# Patient Record
Sex: FEMALE | Race: WHITE
[De-identification: names, ages, dates, MRNs, and addresses within clinical notes are randomized per-mention and may not be internally consistent; named-entity substitution may affect disease eponyms.]

---

## 2022-04-14 ENCOUNTER — HOSPITAL ENCOUNTER (EMERGENCY)
Dept: HOSPITAL 95 - ER | Age: 62
Discharge: HOME | End: 2022-04-14
Payer: OTHER GOVERNMENT

## 2022-04-14 VITALS — BODY MASS INDEX: 24.24 KG/M2 | WEIGHT: 142 LBS | HEIGHT: 64 IN

## 2022-04-14 DIAGNOSIS — M54.42: Primary | ICD-10-CM

## 2022-04-14 PROCEDURE — A9270 NON-COVERED ITEM OR SERVICE: HCPCS

## 2022-06-05 ENCOUNTER — HOSPITAL ENCOUNTER (EMERGENCY)
Dept: HOSPITAL 95 - ER | Age: 62
Discharge: HOME | End: 2022-06-05
Payer: OTHER GOVERNMENT

## 2022-06-05 VITALS — WEIGHT: 134.99 LBS | HEIGHT: 62 IN | BODY MASS INDEX: 24.84 KG/M2

## 2022-06-05 DIAGNOSIS — Z79.899: ICD-10-CM

## 2022-06-05 DIAGNOSIS — Z88.5: ICD-10-CM

## 2022-06-05 DIAGNOSIS — M48.061: ICD-10-CM

## 2022-06-05 DIAGNOSIS — M48.56XA: Primary | ICD-10-CM

## 2022-10-26 ENCOUNTER — HOSPITAL ENCOUNTER (OUTPATIENT)
Dept: HOSPITAL 95 - ORSCSDS | Age: 62
Discharge: HOME | End: 2022-10-26
Attending: OTOLARYNGOLOGY
Payer: OTHER GOVERNMENT

## 2022-10-26 VITALS — BODY MASS INDEX: 24.24 KG/M2 | HEIGHT: 60 IN | WEIGHT: 123.46 LBS

## 2022-10-26 DIAGNOSIS — I10: ICD-10-CM

## 2022-10-26 DIAGNOSIS — Z79.899: ICD-10-CM

## 2022-10-26 DIAGNOSIS — Z79.84: ICD-10-CM

## 2022-10-26 DIAGNOSIS — E11.9: ICD-10-CM

## 2022-10-26 DIAGNOSIS — C73: Primary | ICD-10-CM

## 2022-10-26 PROCEDURE — 0GTK0ZZ RESECTION OF THYROID GLAND, OPEN APPROACH: ICD-10-PCS | Performed by: OTOLARYNGOLOGY

## 2022-10-26 PROCEDURE — 0GBJ0ZZ EXCISION OF THYROID GLAND ISTHMUS, OPEN APPROACH: ICD-10-PCS | Performed by: OTOLARYNGOLOGY

## 2022-10-26 NOTE — NUR
10/26/22 1639 Vincent Resendiz
PT VOMITED ONCE 20ML YELLOW EMESIS. SHE WAS MEDICATED WITH 4MG IV
ZOFRAN,
PER DR. WILCOX ORDERS. SHORTLY AFTER RECIEVING MEDICATION PT STATED
THAT HER NAUSEA HAD RESOLVED. PT STATED THAT HER NAUSEA WAS RETURNING
IMMEDIATELY PRIOR TO DISCHARGE, BUT SHE EXPRESSED READINESS TO GO
HOME. SHE WAS PROVIDED AN EMESIS BAG AND LEMON DROPS TO TAKE HOME. PT
REPORTED 6/10 PAIN AT THE TIME OF DISCHARGE. SHE STATED THAT AMOUNT OF
PAIN WAS TOLERABLE, AND THE SHE WAS READY TO GO HOME.

## 2022-10-26 NOTE — NUR
10/26/22 Parker Han
5MLS LIDOCAINE 2% WITH EPI 1:100,000 MIXED 1:1 WITH 5MLS NACL
TO CREATE A SOLUTION LIDOCAINE 1% WITH EPI 1:200,000.